# Patient Record
(demographics unavailable — no encounter records)

---

## 2025-01-23 NOTE — HISTORY OF PRESENT ILLNESS
[FreeTextEntry1] : 57 day old baby who presents today for ear molding for congenital ear deformity noted at birth and not improvingPt was seen by pediatrician and referred for further evaluation and treatment. Pt was born at [] weeks. There is [] family history of ear deformity. Mom denies complications with pregnancy and delivery there is no significant past medical or surgical history Parent reports normal feeding and elimination patterns and normal infant development. Age appropriate milestones and behavior. Infant hearing screen passed. Appropriate weight gain.

## 2025-01-23 NOTE — PROCEDURE
[FreeTextEntry6] : Dx: Congenital ear deformity, right and left Procedure: Infant ear molding using silicone prosthesis CPT- 59740D/ 79493S [id number]   Physician: Steve Rocha M.D., [facility]  Anesthesia: none  Complications; none  Condition: good  Clinical Summary: The patient was noted to have a bilateral congenital ear deformity at birth, which has not improved. The patient is referred by pediatrician for correction of ear deformity using infant ear molding. There is a constricted and cupped ear deformity of the left and right ears that are amenable to ear molding.   Procedure Note: After completion of feeding, the baby was swaddled and laid on the left side. A silicone impression was taken using putty. The ear was measured for size and an appropriate base was fashioned from a large cradle. A medium retainer was bent and fabricated to the appropriate size to prevent encroachment on the retroauricular sulcus and there was adequate dimension within the cradle for the full dimension of the pinna. Hair was then shaved to leave approximately a one quarter of an inch boundary beyond the adhesive footplate of the posterior cradle. Skin prep was next done with alcohol pads to remove any residual skin oil. The posterior cradle was then slipped over the ear and the posterior conformer aligned precisely with the desired position of the superior limb of the triangular fossa. Care was taken to leave approximately a 1 mm space between the posterior conformer and the retroauricular sulcus. The pinna was then displaced back into the cradle to ensure that the superior limb of the triangular fossa was in perfect alignment with the anti-helical fold. Next the adhesive liners of the posterior cradle were removed allowing the cradle to be secured to the scalp. In addition it was necessary to bend the retractor so as to conform to the ideal shape of the helical rim. The retractor was directly positioned over the abnormal shape of the helical rim. With these adjustments made the internal adhesive liners were removed and the retractor affixed to the inner surface of the cradle. The baby was then placed on the opposite side and the contralateral identical procedure was performed.

## 2025-01-29 NOTE — HISTORY OF PRESENT ILLNESS
[FreeTextEntry1] : 2 month old patient presents to the office with Congenital bilateral ear deformity. noted at birth and not improving.  Born at 39 weeks,  and 1 day, breast milk.  Parent denies complications with pregnancy or delivery.  Birth Weight : 6.3 Current Weight : 9.81 Breast milk.  No family history of ear deformities otherwise, healthy infant.  Parent reports normal feeding and elimination patterns. Normal development to this point.

## 2025-01-29 NOTE — PROCEDURE
[FreeTextEntry6] : Dx: Congenital ear deformity, right and left Procedure: Infant ear molding using silicone prosthesis CPT- 37504O/ 47606C [id number]   Physician: Steve Rocha M.D., [facility]  Anesthesia: none  Complications; none  Condition: good  Clinical Summary: The patient was noted to have a bilateral congenital ear deformity at birth, which has not improved. The patient is referred by pediatrician for correction of ear deformity using infant ear molding. There is a constricted and cupped ear deformity of the left and right ears that are amenable to ear molding.   Procedure Note: After completion of feeding, the baby was swaddled and laid on the left side. A silicone impression was taken using putty. The ear was measured for size and an appropriate base was fashioned from a large cradle. A medium retainer was bent and fabricated to the appropriate size to prevent encroachment on the retroauricular sulcus and there was adequate dimension within the cradle for the full dimension of the pinna. Hair was then shaved to leave approximately a one quarter of an inch boundary beyond the adhesive footplate of the posterior cradle. Skin prep was next done with alcohol pads to remove any residual skin oil. The posterior cradle was then slipped over the ear and the posterior conformer aligned precisely with the desired position of the superior limb of the triangular fossa. Care was taken to leave approximately a 1 mm space between the posterior conformer and the retroauricular sulcus. The pinna was then displaced back into the cradle to ensure that the superior limb of the triangular fossa was in perfect alignment with the anti-helical fold. Next the adhesive liners of the posterior cradle were removed allowing the cradle to be secured to the scalp. In addition it was necessary to bend the retractor so as to conform to the ideal shape of the helical rim. The retractor was directly positioned over the abnormal shape of the helical rim. With these adjustments made the internal adhesive liners were removed and the retractor affixed to the inner surface of the cradle. The baby was then placed on the opposite side and the contralateral identical procedure was performed.

## 2025-01-29 NOTE — PROCEDURE
[FreeTextEntry6] : Dx: Congenital ear deformity, right and left Procedure: Infant ear molding using silicone prosthesis CPT- 55559X/ 65737S [id number]   Physician: Steve Rocha M.D., [facility]  Anesthesia: none  Complications; none  Condition: good  Clinical Summary: The patient was noted to have a bilateral congenital ear deformity at birth, which has not improved. The patient is referred by pediatrician for correction of ear deformity using infant ear molding. There is a constricted and cupped ear deformity of the left and right ears that are amenable to ear molding.   Procedure Note: After completion of feeding, the baby was swaddled and laid on the left side. A silicone impression was taken using putty. The ear was measured for size and an appropriate base was fashioned from a large cradle. A medium retainer was bent and fabricated to the appropriate size to prevent encroachment on the retroauricular sulcus and there was adequate dimension within the cradle for the full dimension of the pinna. Hair was then shaved to leave approximately a one quarter of an inch boundary beyond the adhesive footplate of the posterior cradle. Skin prep was next done with alcohol pads to remove any residual skin oil. The posterior cradle was then slipped over the ear and the posterior conformer aligned precisely with the desired position of the superior limb of the triangular fossa. Care was taken to leave approximately a 1 mm space between the posterior conformer and the retroauricular sulcus. The pinna was then displaced back into the cradle to ensure that the superior limb of the triangular fossa was in perfect alignment with the anti-helical fold. Next the adhesive liners of the posterior cradle were removed allowing the cradle to be secured to the scalp. In addition it was necessary to bend the retractor so as to conform to the ideal shape of the helical rim. The retractor was directly positioned over the abnormal shape of the helical rim. With these adjustments made the internal adhesive liners were removed and the retractor affixed to the inner surface of the cradle. The baby was then placed on the opposite side and the contralateral identical procedure was performed.

## 2025-01-29 NOTE — ASSESSMENT
[FreeTextEntry1] : Gen: NAD: alert; age appropriateEyes: EOM intact; no erythema/injection of sclera Resp: Even, unlabored. No increased WOB CV: Warm, pink, well perfused Neuro: Normal tone. CN VI intact Abd: soft, ND; NT Skin: No rashes/lesions noted HENT: Cupped ears bilaterally and prominence. EAC normal. Jaw midline. NCAT. AFOF, soft. No suture ridging.  A/P; Congenital ear deformity start ear molding; left and right Written instruction for care given and reviewed with parents/patient. risks of failure to correct deformity, relapse and ulceration discussed RTC 2 weeks

## 2025-03-02 NOTE — ASSESSMENT
[FreeTextEntry1] : A/P Ear molds removed. Site cleaned. Ears skin intact and pink. No signs of cellulitis/infection or ulceration. retractors replaced and secured in place. Advised to remain in place for an additional 2 weeks. Parent advised to maintain site clean and dry. Reinforce tape as needed. RTC or call if ear mold comes off before next appointment.

## 2025-03-02 NOTE — HISTORY OF PRESENT ILLNESS
[FreeTextEntry1] : DOP 01/28/25 S/p Bilateral ear molding to correct congenital ear deformity.  Ear molds ongoing [id number] partial correction

## 2025-03-02 NOTE — HISTORY OF PRESENT ILLNESS
[FreeTextEntry1] : DOP 01/28/25 S/p Bilateral ear molding to correct congenital ear deformity.  Ear molds ongoing [id number] partial correction

## 2025-03-02 NOTE — HISTORY OF PRESENT ILLNESS
[FreeTextEntry1] : DOP 01/28/25 S/p Bilateral ear molding to correct congenital ear deformity.  Ear molds ongoing [id number] partial correction